# Patient Record
Sex: MALE | Race: WHITE | Employment: FULL TIME | URBAN - METROPOLITAN AREA
[De-identification: names, ages, dates, MRNs, and addresses within clinical notes are randomized per-mention and may not be internally consistent; named-entity substitution may affect disease eponyms.]

---

## 2017-02-01 ENCOUNTER — ALLSCRIPTS OFFICE VISIT (OUTPATIENT)
Dept: OTHER | Facility: OTHER | Age: 47
End: 2017-02-01

## 2017-07-17 ENCOUNTER — TRANSCRIBE ORDERS (OUTPATIENT)
Dept: ADMINISTRATIVE | Facility: HOSPITAL | Age: 47
End: 2017-07-17

## 2017-07-17 DIAGNOSIS — C43.9 MELANOMA OF SKIN (HCC): Primary | ICD-10-CM

## 2017-07-19 ENCOUNTER — APPOINTMENT (OUTPATIENT)
Dept: LAB | Facility: HOSPITAL | Age: 47
End: 2017-07-19
Attending: SPECIALIST
Payer: COMMERCIAL

## 2017-07-19 DIAGNOSIS — C43.9 MALIGNANT MELANOMA OF SKIN (HCC): ICD-10-CM

## 2017-07-19 LAB
ALBUMIN SERPL BCP-MCNC: 4.3 G/DL (ref 3.5–5)
ALP SERPL-CCNC: 73 U/L (ref 46–116)
ALT SERPL W P-5'-P-CCNC: 104 U/L (ref 12–78)
ANION GAP SERPL CALCULATED.3IONS-SCNC: 11 MMOL/L (ref 4–13)
AST SERPL W P-5'-P-CCNC: 92 U/L (ref 5–45)
BASOPHILS # BLD AUTO: 0.05 THOUSANDS/ΜL (ref 0–0.1)
BASOPHILS NFR BLD AUTO: 1 % (ref 0–1)
BILIRUB SERPL-MCNC: 0.57 MG/DL (ref 0.2–1)
BUN SERPL-MCNC: 23 MG/DL (ref 5–25)
CALCIUM SERPL-MCNC: 9.7 MG/DL (ref 8.3–10.1)
CHLORIDE SERPL-SCNC: 103 MMOL/L (ref 100–108)
CO2 SERPL-SCNC: 26 MMOL/L (ref 21–32)
CREAT SERPL-MCNC: 1.2 MG/DL (ref 0.6–1.3)
EOSINOPHIL # BLD AUTO: 0.17 THOUSAND/ΜL (ref 0–0.61)
EOSINOPHIL NFR BLD AUTO: 3 % (ref 0–6)
ERYTHROCYTE [DISTWIDTH] IN BLOOD BY AUTOMATED COUNT: 13.4 % (ref 11.6–15.1)
GFR SERPL CREATININE-BSD FRML MDRD: >60 ML/MIN/1.73SQ M
GLUCOSE SERPL-MCNC: 108 MG/DL (ref 65–140)
HCT VFR BLD AUTO: 45.3 % (ref 36.5–49.3)
HGB BLD-MCNC: 15.7 G/DL (ref 12–17)
LDH SERPL-CCNC: 223 U/L (ref 81–234)
LYMPHOCYTES # BLD AUTO: 1.31 THOUSANDS/ΜL (ref 0.6–4.47)
LYMPHOCYTES NFR BLD AUTO: 22 % (ref 14–44)
MCH RBC QN AUTO: 30.8 PG (ref 26.8–34.3)
MCHC RBC AUTO-ENTMCNC: 34.7 G/DL (ref 31.4–37.4)
MCV RBC AUTO: 89 FL (ref 82–98)
MONOCYTES # BLD AUTO: 0.56 THOUSAND/ΜL (ref 0.17–1.22)
MONOCYTES NFR BLD AUTO: 10 % (ref 4–12)
NEUTROPHILS # BLD AUTO: 3.75 THOUSANDS/ΜL (ref 1.85–7.62)
NEUTS SEG NFR BLD AUTO: 64 % (ref 43–75)
PLATELET # BLD AUTO: 221 THOUSANDS/UL (ref 149–390)
PMV BLD AUTO: 9.2 FL (ref 8.9–12.7)
POTASSIUM SERPL-SCNC: 3.9 MMOL/L (ref 3.5–5.3)
PROT SERPL-MCNC: 8.2 G/DL (ref 6.4–8.2)
RBC # BLD AUTO: 5.09 MILLION/UL (ref 3.88–5.62)
SODIUM SERPL-SCNC: 140 MMOL/L (ref 136–145)
WBC # BLD AUTO: 5.84 THOUSAND/UL (ref 4.31–10.16)

## 2017-07-19 PROCEDURE — 80053 COMPREHEN METABOLIC PANEL: CPT

## 2017-07-19 PROCEDURE — 83615 LACTATE (LD) (LDH) ENZYME: CPT

## 2017-07-19 PROCEDURE — 36415 COLL VENOUS BLD VENIPUNCTURE: CPT

## 2017-07-19 PROCEDURE — 85025 COMPLETE CBC W/AUTO DIFF WBC: CPT

## 2017-07-21 ENCOUNTER — HOSPITAL ENCOUNTER (OUTPATIENT)
Dept: CT IMAGING | Facility: HOSPITAL | Age: 47
Discharge: HOME | End: 2017-07-21
Attending: SPECIALIST
Payer: COMMERCIAL

## 2017-07-21 DIAGNOSIS — C43.9 MELANOMA OF SKIN (HCC): ICD-10-CM

## 2017-07-21 PROCEDURE — 74177 CT ABD & PELVIS W/CONTRAST: CPT

## 2017-07-21 PROCEDURE — 71260 CT THORAX DX C+: CPT

## 2017-07-21 RX ADMIN — IOHEXOL 100 ML: 350 INJECTION, SOLUTION INTRAVENOUS at 09:02

## 2017-08-01 ENCOUNTER — ALLSCRIPTS OFFICE VISIT (OUTPATIENT)
Dept: OTHER | Facility: OTHER | Age: 47
End: 2017-08-01

## 2017-08-01 DIAGNOSIS — C43.9 MALIGNANT MELANOMA OF SKIN (HCC): ICD-10-CM

## 2018-01-13 VITALS
WEIGHT: 259.31 LBS | SYSTOLIC BLOOD PRESSURE: 142 MMHG | BODY MASS INDEX: 36.3 KG/M2 | HEIGHT: 71 IN | TEMPERATURE: 96.8 F | OXYGEN SATURATION: 96 % | HEART RATE: 84 BPM | RESPIRATION RATE: 18 BRPM | DIASTOLIC BLOOD PRESSURE: 96 MMHG

## 2018-01-14 VITALS
RESPIRATION RATE: 16 BRPM | SYSTOLIC BLOOD PRESSURE: 128 MMHG | HEIGHT: 72 IN | OXYGEN SATURATION: 95 % | DIASTOLIC BLOOD PRESSURE: 80 MMHG | WEIGHT: 253.5 LBS | BODY MASS INDEX: 34.34 KG/M2 | HEART RATE: 80 BPM | TEMPERATURE: 96.6 F

## 2018-02-01 DIAGNOSIS — C43.9 MALIGNANT MELANOMA OF SKIN (HCC): ICD-10-CM

## 2018-02-13 NOTE — PROGRESS NOTES
Hematology/Oncology Outpatient Follow- up Note  Jennifer Jones 49 y o  male MRN: @ Encounter: [id number]        Date:  2/13/2018        Assessment / Plan:    1  Malignant melanoma of the cheek, stage IB disease diagnosed in April 2013  The patient did develop mediastinal and hilar adenopathy, which were biopsied by Dr Donnie Blanton in 2014 and were benign  He follows up with us today  A chest x-ray and labs were ordered and were reviewed  They are unremarkable  He will follow up with us in one year with a CBC, CMP, and LDH along with a CT of the chest, abdomen, and pelvis  He will continue to follow up with Dr Annia Clark regarding his melanoma skin checks  Subjective:   CC: follow up malignant melanoma      HPI:     Mr Hamilton Van is a 62-year-old white male whose history dates back to August of 2012 at which time he developed a right cheek lesion  Then, in October, he started knicking it with his razor, while shaving and it did start to bleed  In April of 2013, he saw a dermatologist and a biopsy was done, which did reveal a malignant melanoma  He was referred to Dr Annia Clark, and a wide excision was done  He tells me that the margins were positive and a second excision was done  He did see Dr Annia Clark about a month ago, he did remove a lesion, which was negative  In June 2014 he had enlarged hilar and mediastinal nodes, they were biopsied and were benign  Interval History:    Overall the patient is doing well  He has a great energy level with an ECOG performance status of zero  He has a good appetite and his weight has been stable  He had not found any new lesions or masses  He denies fevers, chills, CP, SOB, N/V, diarrhea, all other ROS are unremarkable  Cancer Staging: IB malignant melanoma of the right cheek      Previous Hematologic/ Oncologic History:    April 2013 Biopsy then Wide excision  Repeat wide excision  Dr Donnie Blanton biopsy on 6/26/2014 which was negative         Current Hematologic/ Oncologic Treatment:    observation          Test Results:    Imaging: No results found  Labs:   Lab Results   Component Value Date    WBC 5 84 07/19/2017    HGB 15 7 07/19/2017    HCT 45 3 07/19/2017    MCV 89 07/19/2017     07/19/2017     Lab Results   Component Value Date     07/19/2017    K 3 9 07/19/2017     07/19/2017    CO2 26 07/19/2017    ANIONGAP 11 07/19/2017    BUN 23 07/19/2017    CREATININE 1 20 07/19/2017    GLUCOSE 108 07/19/2017    CALCIUM 9 7 07/19/2017    AST 92 (H) 07/19/2017     (H) 07/19/2017    ALKPHOS 73 07/19/2017    PROT 8 2 07/19/2017    BILITOT 0 57 07/19/2017    EGFR >60 0 07/19/2017         No results found for: SPEP, UPEP    No results found for: PSA    No results found for: CEA    No results found for:     No results found for: AFP    No results found for: IRON, TIBC, FERRITIN    No results found for: [id number]      ROS: Review of Systems   Constitutional: Negative  HENT: Negative  Eyes: Negative  Respiratory: Negative  Cardiovascular: Negative  Gastrointestinal: Negative  Endocrine: Negative  Genitourinary: Negative  Musculoskeletal: Negative  Skin: Negative  Allergic/Immunologic: Negative  Neurological: Negative  Hematological: Negative  Psychiatric/Behavioral: Negative  Current Medications: Reviewed  Allergies: Reviewed  PMH/FH/SH:  Reviewed      Physical Exam:    There is no height or weight on file to calculate BSA      Wt Readings from Last 3 Encounters:   08/01/17 115 kg (253 lb 8 oz)   02/01/17 118 kg (259 lb 4 9 oz)   08/03/16 112 kg (247 lb 4 9 oz)        Temp Readings from Last 3 Encounters:   08/01/17 (!) 96 6 °F (35 9 °C)   02/01/17 (!) 96 8 °F (36 °C)   08/03/16 (!) 97 3 °F (36 3 °C)        BP Readings from Last 3 Encounters:   08/01/17 128/80   02/01/17 142/96   08/03/16 140/90         Pulse Readings from Last 3 Encounters:   08/01/17 80   02/01/17 84   08/03/16 96     @LASTSAO2(3)@      Physical Exam   Constitutional: He is oriented to person, place, and time  He appears well-developed and well-nourished  HENT:   Head: Normocephalic and atraumatic  Nose: Nose normal    Mouth/Throat: Oropharynx is clear and moist    Eyes: Conjunctivae and EOM are normal  Pupils are equal, round, and reactive to light  Neck: Normal range of motion  Neck supple  Cardiovascular: Normal rate, regular rhythm and normal heart sounds  Pulmonary/Chest: Effort normal and breath sounds normal    Abdominal: Soft  Bowel sounds are normal    Musculoskeletal: Normal range of motion  Neurological: He is alert and oriented to person, place, and time  He has normal reflexes  Skin: Skin is warm and dry  Psychiatric: He has a normal mood and affect  Judgment normal    Vitals reviewed  Goals and Barriers:  Current Goal: curative intent  Barriers: None  Patient's Capacity to Self Care:  Patient fully able to self care      Code Status: [unfilled]  Advance Directive and Living Will:      Power of :

## 2018-02-14 ENCOUNTER — OFFICE VISIT (OUTPATIENT)
Dept: HEMATOLOGY ONCOLOGY | Facility: CLINIC | Age: 48
End: 2018-02-14
Payer: COMMERCIAL

## 2018-02-14 VITALS
DIASTOLIC BLOOD PRESSURE: 98 MMHG | BODY MASS INDEX: 34.95 KG/M2 | TEMPERATURE: 97.4 F | RESPIRATION RATE: 16 BRPM | HEIGHT: 72 IN | SYSTOLIC BLOOD PRESSURE: 148 MMHG | OXYGEN SATURATION: 95 % | HEART RATE: 90 BPM | WEIGHT: 258 LBS

## 2018-02-14 DIAGNOSIS — C43.30 MALIGNANT MELANOMA OF FACE EXCLUDING EYELID, NOSE, LIP, AND EAR (HCC): Primary | ICD-10-CM

## 2018-02-14 PROCEDURE — 99213 OFFICE O/P EST LOW 20 MIN: CPT | Performed by: PHYSICIAN ASSISTANT

## 2018-02-14 RX ORDER — EZETIMIBE AND SIMVASTATIN 10; 40 MG/1; MG/1
TABLET ORAL
COMMUNITY
Start: 2014-09-12

## 2018-02-14 NOTE — LETTER
February 14, 2018     Renu Crowder MD  [address] [facility]  [address] [address]    Patient: Cheli Bauer   YOB: 1970   Date of Visit: 2/14/2018       Dear Dr Logan Tabares: Thank you for referring Cheli Bauer to me for evaluation  Below are my notes for this consultation  If you have questions, please do not hesitate to call me  I look forward to following your patient along with you  Sincerely,        Glenys Silva PA-C        CC: MD Glenys Rangel PA-C  2/14/2018  3:44 PM  Sign at close encounter  Hematology/Oncology Outpatient Follow- up Note  Cheli Bauer 49 y o  male MRN: @ Encounter: [id number]        Date:  2/13/2018        Assessment / Plan:    1  Malignant melanoma of the cheek, stage IB disease diagnosed in April 2013  The patient did develop mediastinal and hilar adenopathy, which were biopsied by Dr Meche Persaud in 2014 and were benign  He follows up with us today  A chest x-ray and labs were ordered and were reviewed  They are unremarkable  He will follow up with us in one year with a CBC, CMP, and LDH along with a CT of the chest, abdomen, and pelvis  He will continue to follow up with Dr King Ariza regarding his melanoma skin checks  Subjective:   CC: follow up malignant melanoma      HPI:     Mr Trinidad Hassan is a 44-year-old white male whose history dates back to August of 2012 at which time he developed a right cheek lesion  Then, in October, he started knicking it with his razor, while shaving and it did start to bleed  In April of 2013, he saw a dermatologist and a biopsy was done, which did reveal a malignant melanoma  He was referred to Dr King Ariza, and a wide excision was done  He tells me that the margins were positive and a second excision was done  He did see Dr King Ariza about a month ago, he did remove a lesion, which was negative  In June 2014 he had enlarged hilar and mediastinal nodes, they were biopsied and were benign      Interval History:    Overall the patient is doing well  He has a great energy level with an ECOG performance status of zero  He has a good appetite and his weight has been stable  He had not found any new lesions or masses  He denies fevers, chills, CP, SOB, N/V, diarrhea, all other ROS are unremarkable  Cancer Staging: IB malignant melanoma of the right cheek      Previous Hematologic/ Oncologic History:    April 2013 Biopsy then Wide excision  Repeat wide excision  Dr Marie Flushing biopsy on 6/26/2014 which was negative  Current Hematologic/ Oncologic Treatment:    observation          Test Results:    Imaging: No results found  Labs:   Lab Results   Component Value Date    WBC 5 84 07/19/2017    HGB 15 7 07/19/2017    HCT 45 3 07/19/2017    MCV 89 07/19/2017     07/19/2017     Lab Results   Component Value Date     07/19/2017    K 3 9 07/19/2017     07/19/2017    CO2 26 07/19/2017    ANIONGAP 11 07/19/2017    BUN 23 07/19/2017    CREATININE 1 20 07/19/2017    GLUCOSE 108 07/19/2017    CALCIUM 9 7 07/19/2017    AST 92 (H) 07/19/2017     (H) 07/19/2017    ALKPHOS 73 07/19/2017    PROT 8 2 07/19/2017    BILITOT 0 57 07/19/2017    EGFR >60 0 07/19/2017         No results found for: SPEP, UPEP    No results found for: PSA    No results found for: CEA    No results found for:     No results found for: AFP    No results found for: IRON, TIBC, FERRITIN    No results found for: [id number]      ROS: Review of Systems   Constitutional: Negative  HENT: Negative  Eyes: Negative  Respiratory: Negative  Cardiovascular: Negative  Gastrointestinal: Negative  Endocrine: Negative  Genitourinary: Negative  Musculoskeletal: Negative  Skin: Negative  Allergic/Immunologic: Negative  Neurological: Negative  Hematological: Negative  Psychiatric/Behavioral: Negative            Current Medications: Reviewed  Allergies: Reviewed  PMH/FH/SH:  Reviewed      Physical Exam:    There is no height or weight on file to calculate BSA  Wt Readings from Last 3 Encounters:   08/01/17 115 kg (253 lb 8 oz)   02/01/17 118 kg (259 lb 4 9 oz)   08/03/16 112 kg (247 lb 4 9 oz)        Temp Readings from Last 3 Encounters:   08/01/17 (!) 96 6 °F (35 9 °C)   02/01/17 (!) 96 8 °F (36 °C)   08/03/16 (!) 97 3 °F (36 3 °C)        BP Readings from Last 3 Encounters:   08/01/17 128/80   02/01/17 142/96   08/03/16 140/90         Pulse Readings from Last 3 Encounters:   08/01/17 80   02/01/17 84   08/03/16 96     @LASTSAO2(3)@      Physical Exam   Constitutional: He is oriented to person, place, and time  He appears well-developed and well-nourished  HENT:   Head: Normocephalic and atraumatic  Nose: Nose normal    Mouth/Throat: Oropharynx is clear and moist    Eyes: Conjunctivae and EOM are normal  Pupils are equal, round, and reactive to light  Neck: Normal range of motion  Neck supple  Cardiovascular: Normal rate, regular rhythm and normal heart sounds  Pulmonary/Chest: Effort normal and breath sounds normal    Abdominal: Soft  Bowel sounds are normal    Musculoskeletal: Normal range of motion  Neurological: He is alert and oriented to person, place, and time  He has normal reflexes  Skin: Skin is warm and dry  Psychiatric: He has a normal mood and affect  Judgment normal    Vitals reviewed  Goals and Barriers:  Current Goal: curative intent  Barriers: None  Patient's Capacity to Self Care:  Patient fully able to self care      Code Status: [unfilled]  Advance Directive and Living Will:      Power of :

## 2019-02-13 NOTE — PROGRESS NOTES
Hematology/Oncology Outpatient Follow- up Note  Kun Patricio 51 y o  male MRN: @ Encounter: [id number]        Date:  2/13/2019        Assessment / Plan:    1  Malignant melanoma of the cheek, stage IB disease diagnosed in April 2013  The patient did develop mediastinal and hilar adenopathy, which were biopsied by Dr Degroot in 2014 and were benign  He follows up with us today  A chest x-ray and labs were ordered and were reviewed and were unremarkable  He will follow up with us in one year with a CBC, CMP, and LDH along with a chest x-ray  He will continue to follow up with Dr Suzy Quintanilla regarding his melanoma skin checks  Subjective:   CC: follow up regarding malignant melanoma      HPI:    Mr Kim Silveira is a 42-year-old white male whose history dates back to August of 2012 at which time he developed a right cheek lesion  Then, in October, he started knicking it with his razor, while shaving and it did start to bleed  In April of 2013, he saw a dermatologist and a biopsy was done, which did reveal a malignant melanoma  He was referred to Dr Suzy Quintanilla, and a wide excision was done  He tells me that the margins were positive and a second excision was done  He did see Dr Suzy Quintanilla about a month ago, he did remove a lesion, which was negative  In June 2014 he had enlarged hilar and mediastinal nodes, they were biopsied and were benign  Interval History:    Overall the patient is doing well  He has a stable energy level with an ECOG performance status of zero  He has a good appetite and his weight has been stable  He otherwise denies fevers, chills, CP, SOB, N/V, diarrhea, all other ROS are unremarkable  PFHS was reviewed  Cancer Staging:  IB malignant melanoma of the right cheek       Previous Hematologic/ Oncologic History:    April 2013 Biopsy then Wide excision  Repeat wide excision  Dr Degroot biopsy on 6/26/2014 which was negative        Current Hematologic/ Oncologic Treatment: observation          Test Results:    Imaging: No results found  Labs:   Lab Results   Component Value Date    WBC 5 84 07/19/2017    HGB 15 7 07/19/2017    HCT 45 3 07/19/2017    MCV 89 07/19/2017     07/19/2017     Lab Results   Component Value Date    K 3 9 07/19/2017     07/19/2017    CO2 26 07/19/2017    BUN 23 07/19/2017    CREATININE 1 20 07/19/2017    CALCIUM 9 7 07/19/2017    AST 92 (H) 07/19/2017     (H) 07/19/2017    ALKPHOS 73 07/19/2017    EGFR >60 0 07/19/2017         No results found for: SPEP, UPEP    No results found for: PSA    No results found for: CEA    No results found for:     No results found for: AFP    No results found for: IRON, TIBC, FERRITIN    No results found for: [id number]      ROS: Review of Systems   Constitutional: Negative  HENT: Negative  Eyes: Negative  Respiratory: Negative  Cardiovascular: Negative  Gastrointestinal: Negative  Endocrine: Negative  Genitourinary: Negative  Musculoskeletal: Negative  Skin: Negative  Allergic/Immunologic: Negative  Neurological: Negative  Hematological: Negative  Psychiatric/Behavioral: Negative  Current Medications: Reviewed  Allergies: Reviewed  PMH/FH/SH:  Reviewed      Physical Exam:    There is no height or weight on file to calculate BSA  Wt Readings from Last 3 Encounters:   02/14/18 117 kg (258 lb)   08/01/17 115 kg (253 lb 8 oz)   02/01/17 118 kg (259 lb 4 9 oz)        Temp Readings from Last 3 Encounters:   02/14/18 (!) 97 4 °F (36 3 °C) (Tympanic)   08/01/17 (!) 96 6 °F (35 9 °C)   02/01/17 (!) 96 8 °F (36 °C)        BP Readings from Last 3 Encounters:   02/14/18 148/98   08/01/17 128/80   02/01/17 142/96         Pulse Readings from Last 3 Encounters:   02/14/18 90   08/01/17 80   02/01/17 84     @LASTSAO2(3)@      Physical Exam   Constitutional: He is oriented to person, place, and time  He appears well-developed and well-nourished     HENT:   Head: Normocephalic and atraumatic  Nose: Nose normal    Mouth/Throat: Oropharynx is clear and moist    Eyes: Pupils are equal, round, and reactive to light  Conjunctivae and EOM are normal    Neck: Normal range of motion  Neck supple  Cardiovascular: Normal rate, regular rhythm and normal heart sounds  Pulmonary/Chest: Effort normal and breath sounds normal    Abdominal: Soft  Bowel sounds are normal    Musculoskeletal: Normal range of motion  Neurological: He is alert and oriented to person, place, and time  He has normal reflexes  Skin: Skin is warm and dry  Psychiatric: He has a normal mood and affect  Judgment normal    Vitals reviewed  Goals and Barriers:  Current Goal: Curative intent  Barriers: None  Patient's Capacity to Self Care:  Patient fully able to self care      Code Status: [unfilled]  Advance Directive and Living Will:      Power of :

## 2019-02-14 ENCOUNTER — OFFICE VISIT (OUTPATIENT)
Dept: HEMATOLOGY ONCOLOGY | Facility: CLINIC | Age: 49
End: 2019-02-14
Payer: COMMERCIAL

## 2019-02-14 VITALS
BODY MASS INDEX: 34.06 KG/M2 | WEIGHT: 257 LBS | TEMPERATURE: 98.1 F | HEIGHT: 73 IN | RESPIRATION RATE: 14 BRPM | OXYGEN SATURATION: 98 % | SYSTOLIC BLOOD PRESSURE: 140 MMHG | HEART RATE: 70 BPM | DIASTOLIC BLOOD PRESSURE: 82 MMHG

## 2019-02-14 DIAGNOSIS — C43.30 MALIGNANT MELANOMA OF FACE EXCLUDING EYELID, NOSE, LIP, AND EAR (HCC): Primary | ICD-10-CM

## 2019-02-14 PROCEDURE — 99213 OFFICE O/P EST LOW 20 MIN: CPT | Performed by: PHYSICIAN ASSISTANT

## 2019-02-14 NOTE — LETTER
February 14, 2019     Roby Donis MD  [address] [address]    Patient: Kun Patricio   YOB: 1970   Date of Visit: 2/14/2019       Dear Dr Roman Galvin: Thank you for referring Kun Patricio to me for evaluation  Below are my notes for this consultation  If you have questions, please do not hesitate to call me  I look forward to following your patient along with you  Sincerely,        Jacki Willingham MD        CC: MD Glenys Almonte PA-C  2/14/2019  3:59 PM  Sign at close encounter  Hematology/Oncology Outpatient Follow- up Note  Kun Patricio 51 y o  male MRN: @ Encounter: [id number]        Date:  2/13/2019        Assessment / Plan:    1  Malignant melanoma of the cheek, stage IB disease diagnosed in April 2013  The patient did develop mediastinal and hilar adenopathy, which were biopsied by Dr Degroot in 2014 and were benign  He follows up with us today  A chest x-ray and labs were ordered and were reviewed and were unremarkable  He will follow up with us in one year with a CBC, CMP, and LDH along with a chest x-ray  He will continue to follow up with Dr Suzy Quintanilla regarding his melanoma skin checks  Subjective:   CC: follow up regarding malignant melanoma      HPI:    Mr Kim Silveira is a 21-year-old white male whose history dates back to August of 2012 at which time he developed a right cheek lesion  Then, in October, he started knicking it with his razor, while shaving and it did start to bleed  In April of 2013, he saw a dermatologist and a biopsy was done, which did reveal a malignant melanoma  He was referred to Dr Suzy Quintanilla, and a wide excision was done  He tells me that the margins were positive and a second excision was done  He did see Dr Suzy Quintanilla about a month ago, he did remove a lesion, which was negative  In June 2014 he had enlarged hilar and mediastinal nodes, they were biopsied and were benign      Interval History:    Overall the patient is doing well  He has a stable energy level with an ECOG performance status of zero  He has a good appetite and his weight has been stable  He otherwise denies fevers, chills, CP, SOB, N/V, diarrhea, all other ROS are unremarkable  PFHS was reviewed  Cancer Staging:  IB malignant melanoma of the right cheek       Previous Hematologic/ Oncologic History:    April 2013 Biopsy then Wide excision  Repeat wide excision  Dr Harshal Love biopsy on 6/26/2014 which was negative  Current Hematologic/ Oncologic Treatment:    observation          Test Results:    Imaging: No results found  Labs:   Lab Results   Component Value Date    WBC 5 84 07/19/2017    HGB 15 7 07/19/2017    HCT 45 3 07/19/2017    MCV 89 07/19/2017     07/19/2017     Lab Results   Component Value Date    K 3 9 07/19/2017     07/19/2017    CO2 26 07/19/2017    BUN 23 07/19/2017    CREATININE 1 20 07/19/2017    CALCIUM 9 7 07/19/2017    AST 92 (H) 07/19/2017     (H) 07/19/2017    ALKPHOS 73 07/19/2017    EGFR >60 0 07/19/2017         No results found for: SPEP, UPEP    No results found for: PSA    No results found for: CEA    No results found for:     No results found for: AFP    No results found for: IRON, TIBC, FERRITIN    No results found for: [id number]      ROS: Review of Systems   Constitutional: Negative  HENT: Negative  Eyes: Negative  Respiratory: Negative  Cardiovascular: Negative  Gastrointestinal: Negative  Endocrine: Negative  Genitourinary: Negative  Musculoskeletal: Negative  Skin: Negative  Allergic/Immunologic: Negative  Neurological: Negative  Hematological: Negative  Psychiatric/Behavioral: Negative  Current Medications: Reviewed  Allergies: Reviewed  PMH/FH/SH:  Reviewed      Physical Exam:    There is no height or weight on file to calculate BSA      Wt Readings from Last 3 Encounters:   02/14/18 117 kg (258 lb)   08/01/17 115 kg (253 lb 8 oz) 02/01/17 118 kg (259 lb 4 9 oz)        Temp Readings from Last 3 Encounters:   02/14/18 (!) 97 4 °F (36 3 °C) (Tympanic)   08/01/17 (!) 96 6 °F (35 9 °C)   02/01/17 (!) 96 8 °F (36 °C)        BP Readings from Last 3 Encounters:   02/14/18 148/98   08/01/17 128/80   02/01/17 142/96         Pulse Readings from Last 3 Encounters:   02/14/18 90   08/01/17 80   02/01/17 84     @LASTSAO2(3)@      Physical Exam   Constitutional: He is oriented to person, place, and time  He appears well-developed and well-nourished  HENT:   Head: Normocephalic and atraumatic  Nose: Nose normal    Mouth/Throat: Oropharynx is clear and moist    Eyes: Pupils are equal, round, and reactive to light  Conjunctivae and EOM are normal    Neck: Normal range of motion  Neck supple  Cardiovascular: Normal rate, regular rhythm and normal heart sounds  Pulmonary/Chest: Effort normal and breath sounds normal    Abdominal: Soft  Bowel sounds are normal    Musculoskeletal: Normal range of motion  Neurological: He is alert and oriented to person, place, and time  He has normal reflexes  Skin: Skin is warm and dry  Psychiatric: He has a normal mood and affect  Judgment normal    Vitals reviewed  Goals and Barriers:  Current Goal: Curative intent  Barriers: None  Patient's Capacity to Self Care:  Patient fully able to self care      Code Status: [unfilled]  Advance Directive and Living Will:      Power of :

## 2020-01-09 ENCOUNTER — TELEPHONE (OUTPATIENT)
Dept: HEMATOLOGY ONCOLOGY | Facility: CLINIC | Age: 50
End: 2020-01-09

## 2020-02-18 ENCOUNTER — OFFICE VISIT (OUTPATIENT)
Dept: HEMATOLOGY ONCOLOGY | Facility: CLINIC | Age: 50
End: 2020-02-18
Payer: COMMERCIAL

## 2020-02-18 VITALS
TEMPERATURE: 98.7 F | OXYGEN SATURATION: 97 % | SYSTOLIC BLOOD PRESSURE: 122 MMHG | BODY MASS INDEX: 34.85 KG/M2 | HEART RATE: 92 BPM | HEIGHT: 73 IN | DIASTOLIC BLOOD PRESSURE: 82 MMHG | RESPIRATION RATE: 18 BRPM | WEIGHT: 263 LBS

## 2020-02-18 DIAGNOSIS — C43.9 MALIGNANT MELANOMA, UNSPECIFIED SITE (HCC): Primary | ICD-10-CM

## 2020-02-18 PROCEDURE — 99214 OFFICE O/P EST MOD 30 MIN: CPT | Performed by: INTERNAL MEDICINE

## 2020-02-18 RX ORDER — LEFLUNOMIDE 20 MG/1
20 TABLET ORAL DAILY
COMMUNITY

## 2020-02-18 NOTE — PROGRESS NOTES
HEMATOLOGY / ONCOLOGY CLINIC NOTE    Primary Care Provider: Tila Serrano MD  Referring Provider:    MRN: [id number]  : 1970    Reason for Encounter:  Chief Complaint   Patient presents with    Follow-up     1 year         History of Hematology / Oncology Illness:     Jeni Dobbins is a 47 y o  male who came in  to establish care with oncology      1  Malignant melanoma of the cheek, stage IB disease diagnosed in 2013  The patient did develop mediastinal and hilar adenopathy, which were biopsied by Dr Noy Wadsworth in  and were benign  He follows up with us today  A chest x-ray and labs were ordered and were reviewed and were unremarkable  He follows up with Dr Shahid Leo regarding his melanoma skin checks  Assessment / Plan:        1  Malignant melanoma, unspecified site [facility])  - Lab and x-ray result is not available now, will acquire  lab and chest x-ray result;  if all normal, option will be to follow with PCP or continue current surveillance by labs and x-ray on a yearly basis  - CBC and differential; Future  - Comprehensive metabolic panel; Future  - LD,Blood; Future  - XR chest pa & lateral; Future      2,  Cancer screening  -  Up-to-date  Had colonoscopy  25     minutes were spent face to face with patient with greater than 50% of the time spent in counseling or coordination of care including discussions of treatment instructions  All of the patient's questions were answered to their satisfactory during this discussion  Advised pt to call if there is any further questions  Interval History:     2020 : 24-year-old male, History of melanoma came in for follow-up  Subjective doing well no issues  Patient following dermatologist   No new suspicious skin lesion no lumps bumps  Body weight is stable  Nonsmoker  Drinks alcohol socially  Problem list:     There is no problem list on file for this patient          PHYSICIAL EXAMINATION: Vital Signs:   /82 (BP Location: Right arm)   Pulse 92   Temp 98 7 °F (37 1 °C) (Oral)   Resp 18   Ht 6' 1" (1 854 m)   Wt 119 kg (263 lb)   SpO2 97%   BMI 34 70 kg/m²   Body surface area is 2 41 meters squared  Ht Readings from Last 3 Encounters:   02/18/20 6' 1" (1 854 m)   02/14/19 6' 1" (1 854 m)   02/14/18 6' (1 829 m)       Wt Readings from Last 3 Encounters:   02/18/20 119 kg (263 lb)   02/14/19 117 kg (257 lb)   02/14/18 117 kg (258 lb)        Temp Readings from Last 3 Encounters:   02/18/20 98 7 °F (37 1 °C) (Oral)   02/14/19 98 1 °F (36 7 °C)   02/14/18 (!) 97 4 °F (36 3 °C) (Tympanic)        BP Readings from Last 3 Encounters:   02/18/20 122/82   02/14/19 140/82   02/14/18 148/98         Pulse Readings from Last 3 Encounters:   02/18/20 92   02/14/19 70   02/14/18 90           No major findings on physical examination        GEN: Alert, awake oriented x3, in no acute distress  HEENT- No pallor, icterus, cyanosis, no oral mucosal lesions,   LAD - no palpable cervical, clavicle, axillary, inguinal LAD  Heart- normal S1 S2, regular rate and rhythm, No murmur, rubs  Lungs- decreased breathing sound bilateral    Abdomen- soft, Non tender, bowel sounds present  Extremities- No cyanosis, clubbing, edema  Neuro- No focal neurological deficit           PAST MEDICAL HISTORY:   has a past medical history of Skin cancer  PAST SURGICAL HISTORY:   has no past surgical history on file  CURRENT MEDICATIONS:   Current Outpatient Medications   Medication Sig Dispense Refill    ezetimibe-simvastatin (VYTORIN) 10-40 mg per tablet Take by mouth      leflunomide (ARAVA) 20 MG tablet Take 20 mg by mouth daily      metoprolol tartrate (LOPRESSOR) 25 mg tablet Take 50 mg by mouth daily        No current facility-administered medications for this visit  [unfilled]    SOCIAL HISTORY:   reports that he has never smoked  He has never used smokeless tobacco  He reports that he drinks alcohol   He reports that he does not use drugs  FAMILY HISTORY:  family history includes No Known Problems in his father and mother  ALLERGIES:  is allergic to sulfa antibiotics  REVIEW OF SYSTEMS:  Please note that a 14-point review of systems was performed to include Constitutional, HEENT, Respiratory, CVS, GI, , Musculoskeletal, Integumentary, Neurologic, Rheumatologic, Endocrinologic, Psychiatric, Lymphatic, and Hematologic/Oncologic systems were reviewed and are negative unless otherwise stated in HPI  Positive and negative findings pertinent to this evaluation are incorporated into the history of present illness  LAB:  Lab Results   Component Value Date    WBC 5 84 07/19/2017    HGB 15 7 07/19/2017    HCT 45 3 07/19/2017    MCV 89 07/19/2017     07/19/2017     Lab Results   Component Value Date    SODIUM 140 07/19/2017    K 3 9 07/19/2017     07/19/2017    CO2 26 07/19/2017    AGAP 11 07/19/2017    BUN 23 07/19/2017    CREATININE 1 20 07/19/2017    GLUC 108 07/19/2017    CALCIUM 9 7 07/19/2017    AST 92 (H) 07/19/2017     (H) 07/19/2017    ALKPHOS 73 07/19/2017    TP 8 2 07/19/2017    TBILI 0 57 07/19/2017    EGFR >60 0 07/19/2017       CBC with diff:       Invalid input(s):  RBC, TOTALCELLSCOUNTED, SEGS%, GRANS%, LYMPHS%, EOS%, BASO%, ABNEUT, ABGRANS, ABLYMPHS, ABMOMOS, ABEOS, ABBASO    CMP:      Invalid input(s): ALBUMIN    IMAGING:  XR chest pa & lateral    (Results Pending)     No results found

## 2021-01-26 ENCOUNTER — TELEPHONE (OUTPATIENT)
Dept: HEMATOLOGY ONCOLOGY | Facility: CLINIC | Age: 51
End: 2021-01-26

## 2021-02-08 ENCOUNTER — TELEPHONE (OUTPATIENT)
Dept: HEMATOLOGY ONCOLOGY | Facility: CLINIC | Age: 51
End: 2021-02-08

## 2021-02-08 NOTE — TELEPHONE ENCOUNTER
Inbound Calls to Reschedule/Cancel Appointments   Patient Details:  Kym Scott  1970  [id number]     Who is calling? Patient    Date of original appointment 03/02 at 3:40pm   Visit Type Follow Up   Who is the Provider and Location? [facility]    Is the patient on active treatment? Chemo? Radiation? no   The patient would like to cancel, reschedule or make into a virtual appointment? Virtual Visit   Reason for rescheduling or cancelation? Patient wants a tele-visit    Next Steps:    [facility]: After completing the above information, please route to the appropriate Care Team for review  Care Team: Please review and reach out to the patient if you have any changes

## 2021-03-02 ENCOUNTER — TELEMEDICINE (OUTPATIENT)
Dept: HEMATOLOGY ONCOLOGY | Facility: CLINIC | Age: 51
End: 2021-03-02
Payer: COMMERCIAL

## 2021-03-02 DIAGNOSIS — Z85.820 HISTORY OF MELANOMA: Primary | ICD-10-CM

## 2021-03-02 PROCEDURE — 99213 OFFICE O/P EST LOW 20 MIN: CPT | Performed by: INTERNAL MEDICINE

## 2021-03-02 NOTE — PROGRESS NOTES
Virtual Brief Visit    Assessment/Plan:    Problem List Items Addressed This Visit        Other    History of melanoma - Primary                Reason for visit is No chief complaint on file  Encounter provider Amos Whittaker MD PhD    Provider located at [address] [id number]    Recent Visits  No visits were found meeting these conditions  Showing recent visits within past 7 days and meeting all other requirements     Future Appointments  No visits were found meeting these conditions  Showing future appointments within next 150 days and meeting all other requirements        After connecting through telephone, the patient was identified by name and date of birth  Jeni Dobbins was informed that this is a telemedicine visit and that the visit is being conducted through telephone  My office door was closed  No one else was in the room  He acknowledged consent and understanding of privacy and security of the platform  The patient has agreed to participate and understands he can discontinue the visit at any time  Patient is aware this is a billable service  Subjective    Jeni Dobbins is a 45 y o  male  patient reported doing well, no new lumps bumps skin lesion, patient following with dermatologist, no major findings  Patient recently had cancer screening done including PSA and colonoscopy, per patient colonoscopy showed a small polyp  Oncology history    1  Malignant melanoma of the cheek, stage IB disease diagnosed in April 2013  The patient did develop mediastinal and hilar adenopathy, which were biopsied by Dr Noy Wadsworth in 2014 and were benign  He follows up with us today  A chest x-ray and labs were ordered and were reviewed and were unremarkable  He follows up with Dr Shahid Leo regarding his melanoma skin checks      - 3/2021:  Reviewed labs and x-ray of chest with patient, there is no major findings, clinically cured, on surveillance with labs and x-ray on yearly basis    Assessment / Plan:        1  History of melanoma    - reviewed labs and x-ray with patient, no major findings, will continue surveillance by checking labs and x-ray in a year  Follow patient afterwards                HPI     Past Medical History:   Diagnosis Date    Skin cancer        No past surgical history on file  Current Outpatient Medications   Medication Sig Dispense Refill    ezetimibe-simvastatin (VYTORIN) 10-40 mg per tablet Take by mouth      leflunomide (ARAVA) 20 MG tablet Take 20 mg by mouth daily      metoprolol tartrate (LOPRESSOR) 25 mg tablet Take 50 mg by mouth daily        No current facility-administered medications for this visit  Allergies   Allergen Reactions    Sulfa Antibiotics Fever       Review of Systems    There were no vitals filed for this visit  I spent 15 minutes directly with the patient during this visit    VIRTUAL VISIT DISCLAIMER    Ernesto Mcgowan acknowledges that he has consented to an online visit or consultation  He understands that the online visit is based solely on information provided by him, and that, in the absence of a face-to-face physical evaluation by the physician, the diagnosis he receives is both limited and provisional in terms of accuracy and completeness  This is not intended to replace a full medical face-to-face evaluation by the physician  Ernesto Mcgowan understands and accepts these terms

## 2022-01-12 ENCOUNTER — TELEPHONE (OUTPATIENT)
Dept: HEMATOLOGY ONCOLOGY | Facility: CLINIC | Age: 52
End: 2022-01-12

## 2022-01-12 NOTE — TELEPHONE ENCOUNTER
Left a voice message for patient to contact us in regards to rescheduling Dr Mae Counter appointment

## 2022-01-22 ENCOUNTER — TELEPHONE (OUTPATIENT)
Dept: HEMATOLOGY ONCOLOGY | Facility: CLINIC | Age: 52
End: 2022-01-22

## 2022-01-24 ENCOUNTER — TELEPHONE (OUTPATIENT)
Dept: HEMATOLOGY ONCOLOGY | Facility: CLINIC | Age: 52
End: 2022-01-24

## 2022-01-24 NOTE — TELEPHONE ENCOUNTER
Reschedule Appointment     Who is calling in Patient    Doctor Appointment Scheduled with Dr Yesenia Mares date and time 03/08 at 3:00pm    New date and time 03/24 at 2:20pm    Location [facility]   Patient verbalized understanding    yes

## 2022-03-15 ENCOUNTER — TELEPHONE (OUTPATIENT)
Dept: HEMATOLOGY ONCOLOGY | Facility: CLINIC | Age: 52
End: 2022-03-15

## 2023-03-06 NOTE — TELEPHONE ENCOUNTER
Appointment Cancellation Or Reschedule     Person calling in Patient    Provider Dr Kalen Tovar   Office Visit Date and Time  3/24/22 at 2:20   Office Visit Location [facility]   Did patient want to reschedule their office appointment? If so, when was it scheduled to? No   Is this patient calling to reschedule an infusion appointment? No   When is their next infusion appointment? N/a   Is this patient a Chemo patient? No   Reason for Cancellation or Reschedule Feels they do not need appointment, will call back if needed      If the patient is a treatment patient, please route this to the office nurse  If the patient is not on treatment, please route to the office MA 
Noted  Will make Dr Saira Godinez aware 
Clear

## 2025-08-15 ENCOUNTER — APPOINTMENT (OUTPATIENT)
Age: 55
End: 2025-08-15
Payer: COMMERCIAL